# Patient Record
Sex: FEMALE | Race: WHITE | NOT HISPANIC OR LATINO | Employment: FULL TIME | URBAN - METROPOLITAN AREA
[De-identification: names, ages, dates, MRNs, and addresses within clinical notes are randomized per-mention and may not be internally consistent; named-entity substitution may affect disease eponyms.]

---

## 2021-07-02 ENCOUNTER — OFFICE VISIT (OUTPATIENT)
Dept: INTERNAL MEDICINE | Facility: CLINIC | Age: 31
End: 2021-07-02

## 2021-07-02 VITALS
RESPIRATION RATE: 16 BRPM | TEMPERATURE: 98.4 F | DIASTOLIC BLOOD PRESSURE: 78 MMHG | BODY MASS INDEX: 45.65 KG/M2 | WEIGHT: 267.4 LBS | OXYGEN SATURATION: 98 % | SYSTOLIC BLOOD PRESSURE: 132 MMHG | HEART RATE: 97 BPM | HEIGHT: 64 IN

## 2021-07-02 DIAGNOSIS — E66.01 CLASS 3 SEVERE OBESITY DUE TO EXCESS CALORIES WITH BODY MASS INDEX (BMI) OF 45.0 TO 49.9 IN ADULT, UNSPECIFIED WHETHER SERIOUS COMORBIDITY PRESENT (HCC): ICD-10-CM

## 2021-07-02 DIAGNOSIS — Z12.4 ENCOUNTER FOR PAPANICOLAOU SMEAR OF CERVIX: ICD-10-CM

## 2021-07-02 DIAGNOSIS — D23.72: ICD-10-CM

## 2021-07-02 DIAGNOSIS — R73.01 IMPAIRED FASTING GLUCOSE: ICD-10-CM

## 2021-07-02 DIAGNOSIS — Z13.0 SCREENING FOR DEFICIENCY ANEMIA: ICD-10-CM

## 2021-07-02 DIAGNOSIS — Z11.59 ENCOUNTER FOR HEPATITIS C SCREENING TEST FOR LOW RISK PATIENT: ICD-10-CM

## 2021-07-02 DIAGNOSIS — Z13.29 SCREENING FOR THYROID DISORDER: ICD-10-CM

## 2021-07-02 DIAGNOSIS — Z76.89 ENCOUNTER TO ESTABLISH CARE WITH NEW DOCTOR: Primary | ICD-10-CM

## 2021-07-02 DIAGNOSIS — Z13.220 SCREENING FOR LIPID DISORDERS: ICD-10-CM

## 2021-07-02 PROCEDURE — 99204 OFFICE O/P NEW MOD 45 MIN: CPT | Performed by: FAMILY MEDICINE

## 2021-07-02 NOTE — PROGRESS NOTES
"Chief Complaint  Establish Care and Foot Problem (lump )    Subjective          Dusty Mireles presents to [facility] PRIMARY CARE  History of Present Illness     No prior PCP in many years    Impaired fasting glucose -she says a few years back that she was told that she had some sugar in her urine as well as an elevated blood sugar.  I reviewed the chart and saw that she had 50 glucose in her urine as well as a blood sugar in the 170s.  However she has never had follow-up testing regarding this.  We will need to get labs to find out the stability of this condition and would be a new problem with uncertain prognosis.  Could possibly be diabetes but was never followed up upon.    Patient complaining of a small lesion on her left foot that has been changing color in size and feels hard to the touch.  Denies pain but says that when she shaves she can easily cut it.    Also concerned about her breasts as her sister has been diagnosed with breast cancer and is out of date for her Pap smear.  Currently does not have a gynecologist.    She has not had routine labs in many years.    Objective   Vital Signs:   /78 (BP Location: Right arm, Patient Position: Sitting, Cuff Size: Adult)   Pulse 97   Temp 98.4 °F (36.9 °C) (Temporal)   Resp 16   Ht 162.6 cm (64\")   Wt 121 kg (267 lb 6.4 oz)   SpO2 98%   BMI 45.90 kg/m²     Physical Exam  Vitals and nursing note reviewed.   Constitutional:       General: She is not in acute distress.     Appearance: Normal appearance. She is obese.   Cardiovascular:      Rate and Rhythm: Normal rate and regular rhythm.      Heart sounds: Normal heart sounds. No murmur heard.     Pulmonary:      Effort: Pulmonary effort is normal.      Breath sounds: Normal breath sounds.   Skin:            Comments: Approximately 1 cm x 1 cm well-circumscribed dermatofibroma with homogenous pink color.   Neurological:      Mental Status: She is alert.        Result Review :   The " following data was reviewed by: Leland Szymanski MD on 07/02/2021:                Assessment and Plan    Diagnoses and all orders for this visit:    1. Encounter to establish care with new doctor (Primary)  -     CBC (No Diff)  -     Comprehensive Metabolic Panel  -     Hemoglobin A1c  -     Lipid Panel  -     TSH Rfx On Abnormal To Free T4  -     Hepatitis C antibody    2. Dermatofibroma of foot, left  -     Ambulatory Referral to Dermatology    3. Encounter for Papanicolaou smear of cervix  -     Ambulatory Referral to Gynecology    4. Impaired fasting glucose  -     CBC (No Diff)  -     Comprehensive Metabolic Panel  -     Hemoglobin A1c    5. Screening for deficiency anemia  -     CBC (No Diff)    6. Screening for thyroid disorder  -     TSH Rfx On Abnormal To Free T4    7. Screening for lipid disorders  -     Lipid Panel    8. Encounter for hepatitis C screening test for low risk patient  -     Hepatitis C antibody    9. Class 3 severe obesity due to excess calories with body mass index (BMI) of 45.0 to 49.9 in adult, unspecified whether serious comorbidity present (CMS/[facility])      Routine labs today and I am going to check her A1c as well to see what the status of her impaired fasting glucose is at this point.  Refer to dermatology for the dermatofibroma of her left foot.  Refer to gynecology for her routine female care.  Plan on seeing back in about 1 year for an annual physical as long as her labs are okay.  If she is still showing signs of prediabetes then I will likely have her follow-up in 6 months.  Provided information regarding diet on the AVS for the obesity.        Follow Up   Return in about 1 year (around 6/20/2022) for Annual physical.  Patient was given instructions and counseling regarding her condition or for health maintenance advice. Please see specific information pulled into the AVS if appropriate.

## 2021-07-02 NOTE — PATIENT INSTRUCTIONS

## 2021-07-03 LAB
ALBUMIN SERPL-MCNC: 4.7 G/DL (ref 3.5–5.2)
ALBUMIN/GLOB SERPL: 2 G/DL
ALP SERPL-CCNC: 86 U/L (ref 39–117)
ALT SERPL-CCNC: 44 U/L (ref 1–33)
AST SERPL-CCNC: 27 U/L (ref 1–32)
BILIRUB SERPL-MCNC: 0.6 MG/DL (ref 0–1.2)
BUN SERPL-MCNC: 8 MG/DL (ref 6–20)
BUN/CREAT SERPL: 10.5 (ref 7–25)
CALCIUM SERPL-MCNC: 9.8 MG/DL (ref 8.6–10.5)
CHLORIDE SERPL-SCNC: 103 MMOL/L (ref 98–107)
CHOLEST SERPL-MCNC: 179 MG/DL (ref 0–200)
CO2 SERPL-SCNC: 24.4 MMOL/L (ref 22–29)
CREAT SERPL-MCNC: 0.76 MG/DL (ref 0.57–1)
ERYTHROCYTE [DISTWIDTH] IN BLOOD BY AUTOMATED COUNT: 12.9 % (ref 12.3–15.4)
GLOBULIN SER CALC-MCNC: 2.3 GM/DL
GLUCOSE SERPL-MCNC: 96 MG/DL (ref 65–99)
HBA1C MFR BLD: 6.2 % (ref 4.8–5.6)
HCT VFR BLD AUTO: 48.5 % (ref 34–46.6)
HCV AB S/CO SERPL IA: <0.1 S/CO RATIO (ref 0–0.9)
HDLC SERPL-MCNC: 35 MG/DL (ref 40–60)
HGB BLD-MCNC: 16.3 G/DL (ref 12–15.9)
LDLC SERPL CALC-MCNC: 96 MG/DL (ref 0–100)
MCH RBC QN AUTO: 31.3 PG (ref 26.6–33)
MCHC RBC AUTO-ENTMCNC: 33.6 G/DL (ref 31.5–35.7)
MCV RBC AUTO: 93.1 FL (ref 79–97)
PLATELET # BLD AUTO: 349 10*3/MM3 (ref 140–450)
POTASSIUM SERPL-SCNC: 4.6 MMOL/L (ref 3.5–5.2)
PROT SERPL-MCNC: 7 G/DL (ref 6–8.5)
RBC # BLD AUTO: 5.21 10*6/MM3 (ref 3.77–5.28)
SODIUM SERPL-SCNC: 137 MMOL/L (ref 136–145)
TRIGL SERPL-MCNC: 286 MG/DL (ref 0–150)
TSH SERPL DL<=0.005 MIU/L-ACNC: 2.25 UIU/ML (ref 0.27–4.2)
VLDLC SERPL CALC-MCNC: 48 MG/DL (ref 5–40)
WBC # BLD AUTO: 10.92 10*3/MM3 (ref 3.4–10.8)

## 2021-09-29 ENCOUNTER — OFFICE VISIT (OUTPATIENT)
Dept: OBSTETRICS AND GYNECOLOGY | Age: 31
End: 2021-09-29

## 2021-09-29 VITALS
SYSTOLIC BLOOD PRESSURE: 134 MMHG | HEIGHT: 64 IN | WEIGHT: 257.4 LBS | DIASTOLIC BLOOD PRESSURE: 82 MMHG | BODY MASS INDEX: 43.94 KG/M2

## 2021-09-29 DIAGNOSIS — Z80.3 FAMILY HISTORY OF BREAST CANCER: ICD-10-CM

## 2021-09-29 DIAGNOSIS — N93.9 ABNORMAL UTERINE BLEEDING (AUB): ICD-10-CM

## 2021-09-29 DIAGNOSIS — Z01.419 WELL WOMAN EXAM WITH ROUTINE GYNECOLOGICAL EXAM: Primary | ICD-10-CM

## 2021-09-29 DIAGNOSIS — Z76.89 ENCOUNTER TO ESTABLISH CARE: ICD-10-CM

## 2021-09-29 DIAGNOSIS — Z12.4 SCREENING FOR CERVICAL CANCER: ICD-10-CM

## 2021-09-29 PROCEDURE — 99385 PREV VISIT NEW AGE 18-39: CPT | Performed by: STUDENT IN AN ORGANIZED HEALTH CARE EDUCATION/TRAINING PROGRAM

## 2021-09-29 PROCEDURE — 99213 OFFICE O/P EST LOW 20 MIN: CPT | Performed by: STUDENT IN AN ORGANIZED HEALTH CARE EDUCATION/TRAINING PROGRAM

## 2021-09-29 RX ORDER — MEDROXYPROGESTERONE ACETATE 10 MG/1
10 TABLET ORAL DAILY
Qty: 10 TABLET | Refills: 0 | Status: SHIPPED | OUTPATIENT
Start: 2021-09-29 | End: 2021-10-09

## 2021-09-29 NOTE — PROGRESS NOTES
[facility]   Obstetrics and Gynecology   Routine Annual Visit    2021    Patient: Dusty Mireles          MR#:[id number]    History of Present Illness    Chief Complaint   Patient presents with   • Gynecologic Exam     NGYN - AE today, c/o bleeding since    • Establish Care       31 y.o. female  who presents for annual exam.  She has never been to a gynecologist and has lots of anxiety surrounding doctors.  She does not think she has ever had a Pap smear.    She recently had sister diagnosed with breast cancer at 35 years old.  She does not keep in touch with her so does not know many details.  Does not know if sister had genetic testing.  Patient's mother  when she was younger from brain cancer.    Patient's other concern today is abnormal uterine bleeding.  She reports that she normally has regular monthly menses that last around 5 days.  However now, she has been bleeding since  and it is significantly heavier than usual.  She is also reporting significant cramping pain and back pain, which is abnormal for her.      Obstetric History:  OB History        1    Para        Term                AB   1    Living           SAB        TAB        Ectopic        Molar        Multiple        Live Births                   Menstrual History:     Patient's last menstrual period was 2021 (exact date).     See above    Sexual History:     Sexually active with  only who is 50 years old, has 2 children from a different partner, 1 child is mentally handicapped,  has vasectomy    Declines STD testing    Reports that  has long history of herpes and patient thinks she had outbreak 4 to 5 years ago but never since  ________________________________________  Patient Active Problem List   Diagnosis   • Impaired fasting glucose     Past Medical History:   Diagnosis Date   • Epiglottitis      Past Surgical History:   Procedure Laterality Date   • WISDOM  "TOOTH EXTRACTION       Social History     Tobacco Use   Smoking Status Current Some Day Smoker   • Types: Cigarettes   Smokeless Tobacco Never Used     Family History   Problem Relation Age of Onset   • Brain cancer Mother    • No Known Problems Father    • Breast cancer Sister    • Diabetes Maternal Grandmother      Prior to Admission medications    Not on File     ________________________________________    Current contraception: vasectomy  History of abnormal Pap smear: no  Family history of uterine or ovarian cancer: no  Family History of colon cancer/colon polyps: no  History of abnormal mammogram: no  History of abnormal lipids: no    The following portions of the patient's history were reviewed and updated as appropriate: allergies, current medications, past family history, past medical history, past social history, past surgical history and problem list.    Review of Systems   All other systems reviewed and are negative.           Objective     /82   Ht 162.6 cm (64\")   Wt 117 kg (257 lb 6.4 oz)   LMP 09/06/2021 (Exact Date)   Breastfeeding No   BMI 44.18 kg/m²    BP Readings from Last 3 Encounters:   09/29/21 134/82   07/02/21 132/78      Wt Readings from Last 3 Encounters:   09/29/21 117 kg (257 lb 6.4 oz)   07/02/21 121 kg (267 lb 6.4 oz)        BMI: Estimated body mass index is 44.18 kg/m² as calculated from the following:    Height as of this encounter: 162.6 cm (64\").    Weight as of this encounter: 117 kg (257 lb 6.4 oz).    Physical Exam  Vitals and nursing note reviewed. Exam conducted with a chaperone present.   Constitutional:       General: She is not in acute distress.     Appearance: Normal appearance.   HENT:      Head: Normocephalic and atraumatic.   Eyes:      Extraocular Movements: Extraocular movements intact.   Cardiovascular:      Rate and Rhythm: Normal rate and regular rhythm.      Pulses: Normal pulses.      Heart sounds: No murmur heard.     Pulmonary:      Effort: " Pulmonary effort is normal. No respiratory distress.      Breath sounds: Normal breath sounds.   Chest:      Breasts:         Right: Normal. No mass, nipple discharge, skin change or tenderness.         Left: Normal. No mass, nipple discharge, skin change or tenderness.   Abdominal:      General: There is no distension.      Palpations: Abdomen is soft. There is no mass.      Tenderness: There is no abdominal tenderness.   Genitourinary:     General: Normal vulva.      Labia:         Right: No rash or lesion.         Left: No rash or lesion.       Urethra: No prolapse, urethral swelling or urethral lesion.      Vagina: Normal.      Cervix: Normal.      Uterus: Normal.       Adnexa: Right adnexa normal and left adnexa normal.      Comments: Bladder: no masses or tenderness  Perineum/Anus: no masses, lesions, or skin changes  Musculoskeletal:         General: No swelling. Normal range of motion.      Cervical back: Normal range of motion.   Lymphadenopathy:      Upper Body:      Right upper body: No axillary adenopathy.      Left upper body: No axillary adenopathy.   Skin:     General: Skin is warm and dry.   Neurological:      General: No focal deficit present.      Mental Status: She is alert and oriented to person, place, and time.   Psychiatric:         Mood and Affect: Mood normal.         Behavior: Behavior normal.         As part of wellness and prevention, the following topics were discussed with the patient:  Encouraged self breast exam  Physical activity and regular exercised encouraged.   Injury prevention discussed.  Healthy weight discussed.  Nutrition discussed.  Sexual behavior/safe practices discussed.   Sexual transmitted disease prevention   Contraception discussed.   Mental health discussed.   Vaccinations/immunizations addressed.         Patient is a smoker.      Assessment:  Diagnoses and all orders for this visit:    1. Well woman exam with routine gynecological exam (Primary)  -     IGP, Aptima  HPV, Rfx 16 / 18,45  -Breast and pelvic exam normal  -Pap today  -See mammogram discussion below  -Recommended Covid vaccine and flu shot when available  -Discussed Gardasil vaccine at next visit  -Declined STD screening    2. Encounter to establish care    3. Screening for cervical cancer    4. Abnormal uterine bleeding (AUB)  -     CBC (No Diff)  -     TSH Rfx On Abnormal To Free T4  -Recent onset of irregular, heavy menses  -Discussed that there are multiple causes of AUB, including hormonal changes, structural abnormalities, and malignancy.  Because of her history of obesity and glucose intolerance, I recommended endometrial sampling.  However, patient had severe anxiety surrounding pelvic exam and Pap smear today so I suspect endometrial biopsy in office will not be possible.  -Discussed plan to check CBC and TSH today, obtain pelvic ultrasound, and try Provera to stop acute bleeding.  If bleeding persists at this point, would recommend endometrial sampling, with misoprostol and possibly Xanax before.  Patient amenable.  I will call patient when ultrasound results.  -Discussed plan for Provera 10 mg daily for 10 days to stop acute bleeding.  When Provera stopped, discussed expected withdrawal bleed within the next few days.  If menses do not regulate, will plan for endometrial sampling.  May need monthly 10-day course of Provera or long-term option like IUD.  Patient is very hesitant about surgery.    5. Family history of breast cancer  -sister diagnosed at 34yo, not close with sister so does not know details nor if genetic testing performed  -Information for [employer] provided 9/29/2021, patient will contact to seek insurance approval for genetic testing  -Also offered early screening starting now, 5 years prior to first-degree relatives diagnosis, but patient prefers to try genetic testing first      Plan:  Return in about 2 months (around 11/29/2021) for Gyn f/u.      Kayla Mott MD  9/29/2021 13:43  EDT

## 2021-09-30 LAB
ERYTHROCYTE [DISTWIDTH] IN BLOOD BY AUTOMATED COUNT: 12.3 % (ref 12.3–15.4)
HCT VFR BLD AUTO: 44 % (ref 34–46.6)
HGB BLD-MCNC: 15.1 G/DL (ref 12–15.9)
MCH RBC QN AUTO: 31.1 PG (ref 26.6–33)
MCHC RBC AUTO-ENTMCNC: 34.3 G/DL (ref 31.5–35.7)
MCV RBC AUTO: 90.5 FL (ref 79–97)
PLATELET # BLD AUTO: 285 10*3/MM3 (ref 140–450)
RBC # BLD AUTO: 4.86 10*6/MM3 (ref 3.77–5.28)
TSH SERPL DL<=0.005 MIU/L-ACNC: 2.5 UIU/ML (ref 0.27–4.2)
WBC # BLD AUTO: 9.21 10*3/MM3 (ref 3.4–10.8)

## 2021-10-05 LAB
CYTOLOGIST CVX/VAG CYTO: ABNORMAL
CYTOLOGY CVX/VAG DOC CYTO: ABNORMAL
CYTOLOGY CVX/VAG DOC THIN PREP: ABNORMAL
DX ICD CODE: ABNORMAL
HIV 1 & 2 AB SER-IMP: ABNORMAL
HPV I/H RISK 4 DNA CVX QL PROBE+SIG AMP: POSITIVE
HPV16 DNA CVX QL PROBE+SIG AMP: NEGATIVE
HPV18+45 E6+E7 MRNA CVX QL NAA+PROBE: NEGATIVE
OTHER STN SPEC: ABNORMAL
STAT OF ADQ CVX/VAG CYTO-IMP: ABNORMAL

## 2021-10-06 DIAGNOSIS — N93.9 ABNORMAL UTERINE BLEEDING (AUB): Primary | ICD-10-CM

## 2021-10-06 RX ORDER — PROGESTERONE 200 MG/1
200 CAPSULE ORAL DAILY
Qty: 2 CAPSULE | Refills: 0 | Status: SHIPPED | OUTPATIENT
Start: 2021-10-06 | End: 2021-11-01 | Stop reason: HOSPADM

## 2021-10-12 ENCOUNTER — TELEPHONE (OUTPATIENT)
Dept: OBSTETRICS AND GYNECOLOGY | Age: 31
End: 2021-10-12

## 2021-10-29 ENCOUNTER — PRE-ADMISSION TESTING (OUTPATIENT)
Dept: PREADMISSION TESTING | Facility: HOSPITAL | Age: 31
End: 2021-10-29

## 2021-10-29 VITALS
HEIGHT: 64 IN | DIASTOLIC BLOOD PRESSURE: 91 MMHG | TEMPERATURE: 97.7 F | WEIGHT: 250 LBS | SYSTOLIC BLOOD PRESSURE: 142 MMHG | RESPIRATION RATE: 16 BRPM | HEART RATE: 78 BPM | OXYGEN SATURATION: 98 % | BODY MASS INDEX: 42.68 KG/M2

## 2021-10-29 LAB
DEPRECATED RDW RBC AUTO: 41.5 FL (ref 37–54)
ERYTHROCYTE [DISTWIDTH] IN BLOOD BY AUTOMATED COUNT: 12.4 % (ref 12.3–15.4)
HCG SERPL QL: NEGATIVE
HCT VFR BLD AUTO: 47.1 % (ref 34–46.6)
HGB BLD-MCNC: 15.9 G/DL (ref 12–15.9)
MCH RBC QN AUTO: 30.9 PG (ref 26.6–33)
MCHC RBC AUTO-ENTMCNC: 33.8 G/DL (ref 31.5–35.7)
MCV RBC AUTO: 91.6 FL (ref 79–97)
PLATELET # BLD AUTO: 286 10*3/MM3 (ref 140–450)
PMV BLD AUTO: 10 FL (ref 6–12)
RBC # BLD AUTO: 5.14 10*6/MM3 (ref 3.77–5.28)
SARS-COV-2 ORF1AB RESP QL NAA+PROBE: NOT DETECTED
WBC # BLD AUTO: 11.26 10*3/MM3 (ref 3.4–10.8)

## 2021-10-29 PROCEDURE — 36415 COLL VENOUS BLD VENIPUNCTURE: CPT

## 2021-10-29 PROCEDURE — C9803 HOPD COVID-19 SPEC COLLECT: HCPCS | Performed by: NURSE PRACTITIONER

## 2021-10-29 PROCEDURE — 85027 COMPLETE CBC AUTOMATED: CPT

## 2021-10-29 PROCEDURE — 84703 CHORIONIC GONADOTROPIN ASSAY: CPT

## 2021-10-29 PROCEDURE — U0004 COV-19 TEST NON-CDC HGH THRU: HCPCS | Performed by: NURSE PRACTITIONER

## 2021-10-31 PROCEDURE — S0260 H&P FOR SURGERY: HCPCS | Performed by: STUDENT IN AN ORGANIZED HEALTH CARE EDUCATION/TRAINING PROGRAM

## 2021-11-01 ENCOUNTER — ANESTHESIA EVENT (OUTPATIENT)
Dept: PERIOP | Facility: HOSPITAL | Age: 31
End: 2021-11-01

## 2021-11-01 ENCOUNTER — ANESTHESIA (OUTPATIENT)
Dept: PERIOP | Facility: HOSPITAL | Age: 31
End: 2021-11-01

## 2021-11-01 ENCOUNTER — HOSPITAL ENCOUNTER (OUTPATIENT)
Facility: HOSPITAL | Age: 31
Discharge: HOME | End: 2021-11-01
Attending: STUDENT IN AN ORGANIZED HEALTH CARE EDUCATION/TRAINING PROGRAM | Admitting: STUDENT IN AN ORGANIZED HEALTH CARE EDUCATION/TRAINING PROGRAM

## 2021-11-01 VITALS
TEMPERATURE: 97.9 F | BODY MASS INDEX: 43.07 KG/M2 | HEIGHT: 64 IN | HEART RATE: 64 BPM | WEIGHT: 252.3 LBS | OXYGEN SATURATION: 95 % | DIASTOLIC BLOOD PRESSURE: 96 MMHG | SYSTOLIC BLOOD PRESSURE: 142 MMHG | RESPIRATION RATE: 18 BRPM

## 2021-11-01 DIAGNOSIS — N93.9 ABNORMAL UTERINE BLEEDING (AUB): ICD-10-CM

## 2021-11-01 PROCEDURE — 25010000002 PROPOFOL 10 MG/ML EMULSION: Performed by: ANESTHESIOLOGY

## 2021-11-01 PROCEDURE — 58558 HYSTEROSCOPY BIOPSY: CPT | Performed by: STUDENT IN AN ORGANIZED HEALTH CARE EDUCATION/TRAINING PROGRAM

## 2021-11-01 PROCEDURE — C1782 MORCELLATOR: HCPCS | Performed by: STUDENT IN AN ORGANIZED HEALTH CARE EDUCATION/TRAINING PROGRAM

## 2021-11-01 PROCEDURE — 25010000002 MORPHINE PER 10 MG: Performed by: ANESTHESIOLOGY

## 2021-11-01 PROCEDURE — 0 MEPERIDINE PER 100 MG: Performed by: ANESTHESIOLOGY

## 2021-11-01 PROCEDURE — 88305 TISSUE EXAM BY PATHOLOGIST: CPT | Performed by: STUDENT IN AN ORGANIZED HEALTH CARE EDUCATION/TRAINING PROGRAM

## 2021-11-01 PROCEDURE — 25010000002 MIDAZOLAM PER 1 MG: Performed by: ANESTHESIOLOGY

## 2021-11-01 PROCEDURE — 25010000002 KETOROLAC TROMETHAMINE PER 15 MG: Performed by: ANESTHESIOLOGY

## 2021-11-01 PROCEDURE — 58300 INSERT INTRAUTERINE DEVICE: CPT | Performed by: STUDENT IN AN ORGANIZED HEALTH CARE EDUCATION/TRAINING PROGRAM

## 2021-11-01 PROCEDURE — 25010000002 ONDANSETRON PER 1 MG: Performed by: ANESTHESIOLOGY

## 2021-11-01 PROCEDURE — 25010000002 DEXAMETHASONE PER 1 MG: Performed by: ANESTHESIOLOGY

## 2021-11-01 PROCEDURE — 25010000002 FENTANYL CITRATE (PF) 50 MCG/ML SOLUTION: Performed by: ANESTHESIOLOGY

## 2021-11-01 PROCEDURE — C1889 IMPLANT/INSERT DEVICE, NOC: HCPCS | Performed by: STUDENT IN AN ORGANIZED HEALTH CARE EDUCATION/TRAINING PROGRAM

## 2021-11-01 DEVICE — IUD LEVONORGESTREL MIRENA 52MG: Type: IMPLANTABLE DEVICE | Site: UTERUS | Status: FUNCTIONAL

## 2021-11-01 RX ORDER — MEPERIDINE HYDROCHLORIDE 25 MG/ML
12.5 INJECTION INTRAMUSCULAR; INTRAVENOUS; SUBCUTANEOUS ONCE
Status: COMPLETED | OUTPATIENT
Start: 2021-11-01 | End: 2021-11-01

## 2021-11-01 RX ORDER — HYDROCODONE BITARTRATE AND ACETAMINOPHEN 7.5; 325 MG/1; MG/1
1 TABLET ORAL ONCE AS NEEDED
Status: DISCONTINUED | OUTPATIENT
Start: 2021-11-01 | End: 2021-11-01 | Stop reason: HOSPADM

## 2021-11-01 RX ORDER — FENTANYL CITRATE 50 UG/ML
50 INJECTION, SOLUTION INTRAMUSCULAR; INTRAVENOUS
Status: DISCONTINUED | OUTPATIENT
Start: 2021-11-01 | End: 2021-11-01 | Stop reason: HOSPADM

## 2021-11-01 RX ORDER — LIDOCAINE HYDROCHLORIDE 10 MG/ML
0.5 INJECTION, SOLUTION EPIDURAL; INFILTRATION; INTRACAUDAL; PERINEURAL ONCE AS NEEDED
Status: DISCONTINUED | OUTPATIENT
Start: 2021-11-01 | End: 2021-11-01 | Stop reason: HOSPADM

## 2021-11-01 RX ORDER — SODIUM CHLORIDE 0.9 % (FLUSH) 0.9 %
3-10 SYRINGE (ML) INJECTION AS NEEDED
Status: DISCONTINUED | OUTPATIENT
Start: 2021-11-01 | End: 2021-11-01 | Stop reason: HOSPADM

## 2021-11-01 RX ORDER — SODIUM CHLORIDE 0.9 % (FLUSH) 0.9 %
3 SYRINGE (ML) INJECTION EVERY 12 HOURS SCHEDULED
Status: DISCONTINUED | OUTPATIENT
Start: 2021-11-01 | End: 2021-11-01 | Stop reason: HOSPADM

## 2021-11-01 RX ORDER — EPHEDRINE SULFATE 50 MG/ML
5 INJECTION, SOLUTION INTRAVENOUS ONCE AS NEEDED
Status: DISCONTINUED | OUTPATIENT
Start: 2021-11-01 | End: 2021-11-01 | Stop reason: HOSPADM

## 2021-11-01 RX ORDER — DEXAMETHASONE SODIUM PHOSPHATE 10 MG/ML
INJECTION INTRAMUSCULAR; INTRAVENOUS AS NEEDED
Status: DISCONTINUED | OUTPATIENT
Start: 2021-11-01 | End: 2021-11-01 | Stop reason: SURG

## 2021-11-01 RX ORDER — FENTANYL CITRATE 50 UG/ML
25 INJECTION, SOLUTION INTRAMUSCULAR; INTRAVENOUS
Status: DISCONTINUED | OUTPATIENT
Start: 2021-11-01 | End: 2021-11-01 | Stop reason: HOSPADM

## 2021-11-01 RX ORDER — NALOXONE HCL 0.4 MG/ML
0.4 VIAL (ML) INJECTION AS NEEDED
Status: DISCONTINUED | OUTPATIENT
Start: 2021-11-01 | End: 2021-11-01 | Stop reason: HOSPADM

## 2021-11-01 RX ORDER — IBUPROFEN 600 MG/1
600 TABLET ORAL EVERY 6 HOURS PRN
Qty: 30 TABLET | Refills: 0 | Status: SHIPPED | OUTPATIENT
Start: 2021-11-01

## 2021-11-01 RX ORDER — SODIUM CHLORIDE, SODIUM LACTATE, POTASSIUM CHLORIDE, CALCIUM CHLORIDE 600; 310; 30; 20 MG/100ML; MG/100ML; MG/100ML; MG/100ML
100 INJECTION, SOLUTION INTRAVENOUS CONTINUOUS
Status: DISCONTINUED | OUTPATIENT
Start: 2021-11-01 | End: 2021-11-01 | Stop reason: HOSPADM

## 2021-11-01 RX ORDER — MAGNESIUM HYDROXIDE 1200 MG/15ML
LIQUID ORAL AS NEEDED
Status: DISCONTINUED | OUTPATIENT
Start: 2021-11-01 | End: 2021-11-01 | Stop reason: HOSPADM

## 2021-11-01 RX ORDER — SODIUM CHLORIDE, SODIUM LACTATE, POTASSIUM CHLORIDE, CALCIUM CHLORIDE 600; 310; 30; 20 MG/100ML; MG/100ML; MG/100ML; MG/100ML
9 INJECTION, SOLUTION INTRAVENOUS CONTINUOUS
Status: DISCONTINUED | OUTPATIENT
Start: 2021-11-01 | End: 2021-11-01 | Stop reason: HOSPADM

## 2021-11-01 RX ORDER — MIDAZOLAM HYDROCHLORIDE 1 MG/ML
1 INJECTION INTRAMUSCULAR; INTRAVENOUS
Status: DISCONTINUED | OUTPATIENT
Start: 2021-11-01 | End: 2021-11-01 | Stop reason: HOSPADM

## 2021-11-01 RX ORDER — ONDANSETRON 2 MG/ML
INJECTION INTRAMUSCULAR; INTRAVENOUS AS NEEDED
Status: DISCONTINUED | OUTPATIENT
Start: 2021-11-01 | End: 2021-11-01 | Stop reason: SURG

## 2021-11-01 RX ORDER — ONDANSETRON 2 MG/ML
4 INJECTION INTRAMUSCULAR; INTRAVENOUS ONCE AS NEEDED
Status: COMPLETED | OUTPATIENT
Start: 2021-11-01 | End: 2021-11-01

## 2021-11-01 RX ORDER — FAMOTIDINE 10 MG/ML
20 INJECTION, SOLUTION INTRAVENOUS ONCE
Status: COMPLETED | OUTPATIENT
Start: 2021-11-01 | End: 2021-11-01

## 2021-11-01 RX ORDER — KETOROLAC TROMETHAMINE 30 MG/ML
INJECTION, SOLUTION INTRAMUSCULAR; INTRAVENOUS AS NEEDED
Status: DISCONTINUED | OUTPATIENT
Start: 2021-11-01 | End: 2021-11-01 | Stop reason: SURG

## 2021-11-01 RX ORDER — PROPOFOL 10 MG/ML
VIAL (ML) INTRAVENOUS AS NEEDED
Status: DISCONTINUED | OUTPATIENT
Start: 2021-11-01 | End: 2021-11-01 | Stop reason: SURG

## 2021-11-01 RX ORDER — IBUPROFEN 600 MG/1
600 TABLET ORAL EVERY 6 HOURS PRN
Status: DISCONTINUED | OUTPATIENT
Start: 2021-11-01 | End: 2021-11-01 | Stop reason: HOSPADM

## 2021-11-01 RX ORDER — MORPHINE SULFATE 2 MG/ML
2 INJECTION, SOLUTION INTRAMUSCULAR; INTRAVENOUS
Status: DISCONTINUED | OUTPATIENT
Start: 2021-11-01 | End: 2021-11-01 | Stop reason: HOSPADM

## 2021-11-01 RX ADMIN — FAMOTIDINE 20 MG: 10 INJECTION INTRAVENOUS at 14:51

## 2021-11-01 RX ADMIN — ONDANSETRON 4 MG: 2 INJECTION INTRAMUSCULAR; INTRAVENOUS at 15:45

## 2021-11-01 RX ADMIN — MIDAZOLAM 1 MG: 1 INJECTION INTRAMUSCULAR; INTRAVENOUS at 14:51

## 2021-11-01 RX ADMIN — MORPHINE SULFATE 2 MG: 2 INJECTION, SOLUTION INTRAMUSCULAR; INTRAVENOUS at 17:14

## 2021-11-01 RX ADMIN — MEPERIDINE HYDROCHLORIDE 12.5 MG: 25 INJECTION INTRAMUSCULAR; INTRAVENOUS; SUBCUTANEOUS at 17:01

## 2021-11-01 RX ADMIN — PROPOFOL 100 MG: 10 INJECTION, EMULSION INTRAVENOUS at 15:56

## 2021-11-01 RX ADMIN — ONDANSETRON 4 MG: 2 INJECTION INTRAMUSCULAR; INTRAVENOUS at 16:49

## 2021-11-01 RX ADMIN — KETOROLAC TROMETHAMINE 30 MG: 30 INJECTION, SOLUTION INTRAMUSCULAR at 15:44

## 2021-11-01 RX ADMIN — HYDROCODONE BITARTRATE AND ACETAMINOPHEN 1 TABLET: 7.5; 325 TABLET ORAL at 17:17

## 2021-11-01 RX ADMIN — FENTANYL CITRATE 25 MCG: 50 INJECTION INTRAMUSCULAR; INTRAVENOUS at 17:07

## 2021-11-01 RX ADMIN — DEXAMETHASONE SODIUM PHOSPHATE 8 MG: 10 INJECTION INTRAMUSCULAR; INTRAVENOUS at 15:45

## 2021-11-01 RX ADMIN — SODIUM CHLORIDE, POTASSIUM CHLORIDE, SODIUM LACTATE AND CALCIUM CHLORIDE 9 ML/HR: 600; 310; 30; 20 INJECTION, SOLUTION INTRAVENOUS at 14:50

## 2021-11-01 RX ADMIN — FENTANYL CITRATE 50 MCG: 0.05 INJECTION, SOLUTION INTRAMUSCULAR; INTRAVENOUS at 16:24

## 2021-11-01 RX ADMIN — FENTANYL CITRATE 50 MCG: 0.05 INJECTION, SOLUTION INTRAMUSCULAR; INTRAVENOUS at 16:05

## 2021-11-01 RX ADMIN — PROPOFOL 200 MG: 10 INJECTION, EMULSION INTRAVENOUS at 15:46

## 2021-11-01 RX ADMIN — FENTANYL CITRATE 25 MCG: 50 INJECTION INTRAMUSCULAR; INTRAVENOUS at 16:49

## 2021-11-01 RX ADMIN — SODIUM CHLORIDE, POTASSIUM CHLORIDE, SODIUM LACTATE AND CALCIUM CHLORIDE: 600; 310; 30; 20 INJECTION, SOLUTION INTRAVENOUS at 15:39

## 2021-11-01 NOTE — INTERVAL H&P NOTE
H&P reviewed. The patient was examined and there are no changes to the H&P.    Patient desires Mirena IUD insertion with procedure.  Discussed risks, benefits, and alternatives, as well as lasting for 7 years.

## 2021-11-01 NOTE — ANESTHESIA POSTPROCEDURE EVALUATION
Patient: Dusty Mireles    Procedure Summary     Date: 11/01/21 Room / Location: [facility] OR  / [facility] MAIN OR    Anesthesia Start: 1538 Anesthesia Stop: 1641    Procedure: DILATATION AND CURETTAGE HYSTEROSCOPY with myosure (N/A Uterus) Diagnosis:       Abnormal uterine bleeding (AUB)      (Abnormal uterine bleeding (AUB) [N93.9])    Surgeons: Kayla Mott MD Provider: Mague Evans MD    Anesthesia Type: general ASA Status: 3          Anesthesia Type: general    Vitals  Vitals Value Taken Time   /101 11/01/21 1716   Temp 36.6 °C (97.9 °F) 11/01/21 1636   Pulse 66 11/01/21 1725   Resp 18 11/01/21 1715   SpO2 100 % 11/01/21 1725   Vitals shown include unvalidated device data.        Post Anesthesia Care and Evaluation    Patient location during evaluation: PHASE II  Patient participation: complete - patient participated  Level of consciousness: awake  Pain score: 2  Pain management: adequate  Airway patency: patent  Anesthetic complications: No anesthetic complications  PONV Status: none  Cardiovascular status: acceptable  Respiratory status: acceptable  Hydration status: acceptable  Post Neuraxial Block status: Motor and sensory function returned to baseline

## 2021-11-01 NOTE — ANESTHESIA PROCEDURE NOTES
Airway  Urgency: elective    Date/Time: 11/1/2021 3:49 PM  Airway not difficult    General Information and Staff    Patient location during procedure: OR  Anesthesiologist: Mague Evans MD    Indications and Patient Condition  Indications for airway management: airway protection    Preoxygenated: yes  Mask difficulty assessment: 1 - vent by mask    Final Airway Details  Final airway type: supraglottic airway      Successful airway: classic  Size 4    Number of attempts at approach: 1  Assessment: lips, teeth, and gum same as pre-op and atraumatic intubation

## 2021-11-01 NOTE — OP NOTE
DILATATION AND CURETTAGE HYSTEROSCOPY WITH MORCELLATOR  Procedure Report    Patient Name:  Dusty Mireles  YOB: 1990    Date of Surgery:  11/1/2021     Indications:  Abnormal uterine bleeding    Pre-op Diagnosis:   Abnormal uterine bleeding (AUB) [N93.9]       Post-Op Diagnosis Codes:     * Abnormal uterine bleeding (AUB) [N93.9]    Procedure/CPT® Codes:      Procedure(s):  DILATATION AND CURETTAGE HYSTEROSCOPY with myosure    Staff:  Surgeon(s):  Kayla Mott MD         Anesthesia: General    Estimated Blood Loss: minimal    Implants:    Implant Name Type Inv. Item Serial No.  Lot No. LRB No. Used Action   IUD LEVONORGESTREL MIRENA 52MG - [id number] Implant IUD LEVONORGESTREL MIRENA 52MG  [employer] DIV [id number] N/A 1 Implanted     Exp Date 12/2023    Specimen:          Specimens     ID Source Type Tests Collected By Collected At Frozen?    A Endometrial Curettings Tissue · TISSUE PATHOLOGY EXAM   Kayla Mott MD 11/1/21 9427               Findings:   -normal female external genitalia, normal nulliparous cervix  -multiple small polyps and fluffy endometrium within endometrial cavity    Complications: none    Description of Procedure:  The patient was taken to the operating room and placed in supine position.  General anesthesia was administered and the patient was prepped and draped in the usual sterile fashion with her legs placed in Raul stirrups.  Straight catheterization was performed.  The surgical time-out is completed for the procedure    Weighted speculum was placed in the vagina, and the cervix was grasped anteriorly with a single-tooth tenaculum.  Hysteroscope was unable to be passed through cervix without dilation.  Sequential dilators were used to dilate the cervix to 16 [place].  The MyoSure scope was inserted, and the cavity was distended with normal saline.  Complete survey of uterine cavity revealed multiple polyps and fluffy endometrial  tissue.  The tubal ostia are identified bilaterally.  Myosure Reach was used to remove all visible pathology.  Tissue was sent to pathology for evaluation.  Hysteroscope was removed.      Uterus was sounded to 8cm.  Mirena IUD was inserted in typical fashion.  Strings were trimmed to 2cm.  Tenaculum was removed from cervix.  Hemostasis was confirmed.  The patient was awakened and taken to the recovery room in stable condition.      Kayla Mott MD     Date: 11/1/2021  Time: 16:57 EDT

## 2021-11-01 NOTE — ANESTHESIA PREPROCEDURE EVALUATION
Anesthesia Evaluation     Patient summary reviewed and Nursing notes reviewed                Airway   Mallampati: II  TM distance: >3 FB  Neck ROM: full  Dental      Pulmonary    (+) a smoker Current,   Cardiovascular - negative cardio ROS    Rhythm: regular  Rate: normal        Neuro/Psych- negative ROS  GI/Hepatic/Renal/Endo    (+) morbid obesity,      Musculoskeletal (-) negative ROS    Abdominal    Substance History - negative use     OB/GYN negative ob/gyn ROS         Other                        Anesthesia Plan    ASA 3     general   (BMI    Have CMAC available    I have reviewed the patient's history with the patient and the chart, including all pertinent laboratory results and imaging. I have explained the risks of anesthesia including but not limited to dental damage, corneal abrasion, nerve injury, MI, stroke, and death. Questions asked and answered. Anesthetic plan discussed with patient and team as indicated. Patient expressed understanding of the above.  )  intravenous induction     Anesthetic plan, all risks, benefits, and alternatives have been provided, discussed and informed consent has been obtained with: patient.

## 2021-11-03 LAB
LAB AP CASE REPORT: NORMAL
PATH REPORT.FINAL DX SPEC: NORMAL
PATH REPORT.GROSS SPEC: NORMAL

## 2021-11-10 ENCOUNTER — TELEPHONE (OUTPATIENT)
Dept: OBSTETRICS AND GYNECOLOGY | Age: 31
End: 2021-11-10

## 2021-11-10 NOTE — TELEPHONE ENCOUNTER
I returned patient's call yesterday.  She reports continued cramping pelvic pain since surgery, >1 wk now.  She is taking ibuprofen 800mg q8.  She states the medicine helps but wares off around 5 hrs after the dose.  Bleeding has slowed but is still present.  She is working still.  I offered an ultrasound to evaluate the IUD position.  She declined 2/2 work, saying she could not get off.  I offered a 730 ultrasound.  Patient will call to schedule if the pain dose not improve soon.  We discussed alternating tylenol 650mg q6 and ibuprofen 600mg q6 as well.

## 2021-11-17 ENCOUNTER — OFFICE VISIT (OUTPATIENT)
Dept: OBSTETRICS AND GYNECOLOGY | Age: 31
End: 2021-11-17

## 2021-11-17 VITALS
DIASTOLIC BLOOD PRESSURE: 82 MMHG | WEIGHT: 251.2 LBS | BODY MASS INDEX: 42.88 KG/M2 | SYSTOLIC BLOOD PRESSURE: 136 MMHG | HEIGHT: 64 IN

## 2021-11-17 DIAGNOSIS — N92.0 MENORRHAGIA WITH REGULAR CYCLE: Primary | ICD-10-CM

## 2021-11-17 DIAGNOSIS — Z98.890 STATUS POST HYSTEROSCOPY: ICD-10-CM

## 2021-11-17 DIAGNOSIS — R10.2 PELVIC PAIN: ICD-10-CM

## 2021-11-17 PROCEDURE — 99213 OFFICE O/P EST LOW 20 MIN: CPT | Performed by: STUDENT IN AN ORGANIZED HEALTH CARE EDUCATION/TRAINING PROGRAM

## 2021-11-17 RX ORDER — CYCLOBENZAPRINE HCL 10 MG
10 TABLET ORAL 2 TIMES DAILY PRN
Qty: 30 TABLET | Refills: 0 | Status: SHIPPED | OUTPATIENT
Start: 2021-11-17 | End: 2022-11-17

## 2021-11-17 NOTE — PROGRESS NOTES
[facility]   Obstetrics and Gynecology     2021      Patient:  Dusty Mireles   MR#:[id number]    Office note    Chief Complaint   Patient presents with   • Post-op     Post op 2 wks D&C hysteroscopy and Mirena insertion 2021       Subjective     History of Present Illness  31 y.o. female  presents for follow-up after hysteroscopy D&C and IUD placement on 2021 for AUB.  Endometrial survey showed multiple small polyps and fluffy endometrium.  Pathology was consistent with benign proliferative endometrium.    Patient reports persistent pelvic cramping since surgery.  She is taking NSAIDs around-the-clock and still has discomfort in lower abdomen, starts at midline and radiates outwards.  Seems worse in certain positions.  Reports minor spotting otherwise.    Also reports pain around clitoris when wiping that started about 2 days ago.    Relevant data reviewed:  Tissue Pathology Exam (2021 16:33)      Patient Active Problem List   Diagnosis   • Impaired fasting glucose   • Family history of breast cancer   • Abnormal uterine bleeding (AUB)       Past Medical History:   Diagnosis Date   • Abnormal uterine bleeding (AUB)    • History of epiglottitis      Past Surgical History:   Procedure Laterality Date   • D & C HYSTEROSCOPY N/A 2021    Procedure: DILATATION AND CURETTAGE HYSTEROSCOPY with myosure;  Surgeon: Kayla Mott MD;  Location: [facility];  Service: Obstetrics/Gynecology;  Laterality: N/A;   • WISDOM TOOTH EXTRACTION       Obstetric History:  OB History        1    Para        Term                AB   1    Living           SAB        IAB        Ectopic        Molar        Multiple        Live Births                   Menstrual History:     No LMP recorded. Patient has had an implant.       # 1 - Date: None, Sex: None, Weight: None, GA: None, Delivery: None, Apgar1: None, Apgar5: None, Living: None, Birth Comments: None    Family  "History   Problem Relation Age of Onset   • Brain cancer Mother    • No Known Problems Father    • Breast cancer Sister    • Diabetes Maternal Grandmother    • Malig Hyperthermia Neg Hx      Social History     Tobacco Use   • Smoking status: Current Some Day Smoker     Types: Cigarettes   • Smokeless tobacco: Never Used   • Tobacco comment: 1 PACK EVERY TWO WEEKS X 10 YEARS    Vaping Use   • Vaping Use: Never used   Substance Use Topics   • Alcohol use: Yes     Comment: 1-2 GLASSES/ NIGHT    • Drug use: Not Currently     Patient has no known allergies.    Current Outpatient Medications:   •  ibuprofen (ADVIL,MOTRIN) 600 MG tablet, Take 1 tablet by mouth Every 6 (Six) Hours As Needed for Mild Pain ., Disp: 30 tablet, Rfl: 0  •  levonorgestrel (Mirena, 52 MG,) 20 MCG/24HR IUD, 1 each by Intrauterine route 1 (One) Time., Disp: , Rfl:   •  cyclobenzaprine (FLEXERIL) 10 MG tablet, Take 1 tablet by mouth 2 (Two) Times a Day As Needed for Muscle Spasms., Disp: 30 tablet, Rfl: 0    The following portions of the patient's history were reviewed and updated as appropriate: allergies, current medications, past family history, past medical history, past social history, past surgical history and problem list.    Review of Systems   Genitourinary: Positive for pelvic pain.   All other systems reviewed and are negative.      BP Readings from Last 3 Encounters:   11/17/21 136/82   11/01/21 142/96   10/29/21 142/91      Wt Readings from Last 3 Encounters:   11/17/21 114 kg (251 lb 3.2 oz)   11/01/21 114 kg (252 lb 4.8 oz)   10/29/21 113 kg (250 lb)      BMI: Estimated body mass index is 43.12 kg/m² as calculated from the following:    Height as of this encounter: 162.6 cm (64\").    Weight as of this encounter: 114 kg (251 lb 3.2 oz). BSA: Estimated body surface area is 2.16 meters squared as calculated from the following:    Height as of this encounter: 162.6 cm (64\").    Weight as of this encounter: 114 kg (251 lb 3.2 " oz).    Objective   Physical Exam  Vitals and nursing note reviewed. Exam conducted with a chaperone present.   Constitutional:       General: She is not in acute distress.     Appearance: Normal appearance.   Pulmonary:      Effort: Pulmonary effort is normal.   Abdominal:      Palpations: Abdomen is soft. There is no mass.      Tenderness: There is no abdominal tenderness. There is no guarding or rebound.   Genitourinary:     General: Normal vulva.      Vagina: Normal.      Cervix: Normal.      Uterus: Normal.       Adnexa: Right adnexa normal and left adnexa normal.   Neurological:      Mental Status: She is alert.         Assessment/Plan     Diagnoses and all orders for this visit:    1. Menorrhagia with regular cycle (Primary)    2. Pelvic pain    3. Status post hysteroscopy    Other orders  -     cyclobenzaprine (FLEXERIL) 10 MG tablet; Take 1 tablet by mouth 2 (Two) Times a Day As Needed for Muscle Spasms.  Dispense: 30 tablet; Refill: 0    -Discussed benign pathology  -Ultrasound today confirms IUD in correct position  -Discussed continuing Tylenol and ibuprofen every 6 hours, alternating.  Also discussed adding Flexeril up to twice daily as needed, as patient's pain sounds muscular in origin.  We discussed that this is a short-term treatment.  -We discussed keeping IUD for up to 3 months to see if symptoms resolve.  We also discussed that patient can request removal of any point if she could no longer tolerate symptoms.  She would like to keep in place for now.  RTC 2 weeks to see if Flexeril helped.    Return in about 2 weeks (around 12/1/2021) for F/u pelvic pain.    Kayla Mott MD   11/17/2021 16:41 EST

## 2021-11-17 NOTE — PATIENT INSTRUCTIONS
Alternate tylenol 650mg and ibuprofen 600mg every 6 hours (that means take one of those every 3 hours).  Add flexeril up to twice daily.

## 2024-02-01 ENCOUNTER — OFFICE VISIT (OUTPATIENT)
Dept: OBSTETRICS AND GYNECOLOGY | Age: 34
End: 2024-02-01
Payer: COMMERCIAL

## 2024-02-01 VITALS
DIASTOLIC BLOOD PRESSURE: 72 MMHG | SYSTOLIC BLOOD PRESSURE: 126 MMHG | HEIGHT: 64 IN | WEIGHT: 284.2 LBS | BODY MASS INDEX: 48.52 KG/M2

## 2024-02-01 DIAGNOSIS — Z01.419 WELL WOMAN EXAM WITH ROUTINE GYNECOLOGICAL EXAM: Primary | ICD-10-CM

## 2024-02-01 DIAGNOSIS — Z30.432 ENCOUNTER FOR IUD REMOVAL: ICD-10-CM

## 2024-02-01 DIAGNOSIS — Z12.4 SCREENING FOR CERVICAL CANCER: ICD-10-CM

## 2024-02-01 RX ORDER — IBUPROFEN 600 MG/1
600 TABLET ORAL EVERY 6 HOURS PRN
Qty: 30 TABLET | Refills: 11 | Status: SHIPPED | OUTPATIENT
Start: 2024-02-01

## 2024-02-01 NOTE — PROGRESS NOTES
[facility]   Obstetrics and Gynecology   Routine Annual Visit    2024    Patient: Dusty Mireles          MR#:[id number]    History of Present Illness    Chief Complaint   Patient presents with    Follow-up     IUD removal, c/o cramping and heavy periods       33 y.o. female  who presents for annual exam.  Mirena IUD in place since 2021 for menstrual control.  After IUD placement she had some cramping but that improved with time.  Menses and cramping were better to until about 6 months ago.  She now reports more erratic bleeding and severe cramping before menses.  She desires IUD removal.   has vasectomy so does not need pregnancy prevention.    Studies reviewed:  IGP, Aptima HPV, Rfx 16 / 18,45 (2021 14:10) NIL, HPV pos, 16/18 neg, repeat today    Obstetric History:  OB History          1    Para        Term                AB   1    Living             SAB        IAB        Ectopic        Molar        Multiple        Live Births                   Menstrual History:     No LMP recorded (lmp unknown). Patient has had an implant.       Sexual History:   Sexually active with  only, declines STD exam   has vasectomy      ________________________________________  Patient Active Problem List   Diagnosis    Impaired fasting glucose    Family history of breast cancer    Abnormal uterine bleeding (AUB)     Past Medical History:   Diagnosis Date    Abnormal uterine bleeding (AUB)     History of epiglottitis      Past Surgical History:   Procedure Laterality Date    D & C HYSTEROSCOPY N/A 2021    Procedure: DILATATION AND CURETTAGE HYSTEROSCOPY with myosure;  Surgeon: Kayla Mott MD;  Location: [facility];  Service: Obstetrics/Gynecology;  Laterality: N/A;    WISDOM TOOTH EXTRACTION       Social History     Tobacco Use   Smoking Status Former    Types: Cigarettes    Passive exposure: Never   Smokeless Tobacco Never   Tobacco Comments  "   1 PACK EVERY TWO WEEKS X 10 YEARS      Family History   Problem Relation Age of Onset    No Known Problems Father     Brain cancer Mother     Breast cancer Sister 35    Diabetes Maternal Grandmother     Roya Hyperthermia Neg Hx     Ovarian cancer Neg Hx     Uterine cancer Neg Hx     Colon cancer Neg Hx      Prior to Admission medications    Medication Sig Start Date End Date Taking? Authorizing Provider   ibuprofen (ADVIL,MOTRIN) 600 MG tablet Take 1 tablet by mouth Every 6 (Six) Hours As Needed for Mild Pain . 11/1/21  Yes Kayla Mott MD   levonorgestrel (Mirena, 52 MG,) 20 MCG/24HR IUD 1 each by Intrauterine route 1 (One) Time. 11/1/21  Yes ProviderHolger MD     ________________________________________    Current contraception: vasectomy  History of abnormal Pap smear: yes - see above  Family history of uterine or ovarian cancer: no  Family History of colon cancer/colon polyps: no  History of abnormal mammogram: no    The following portions of the patient's history were reviewed and updated as appropriate: allergies, current medications, past family history, past medical history, past social history, past surgical history, and problem list.    Review of Systems   All other systems reviewed and are negative.           Objective     /72   Ht 162.6 cm (64\")   Wt 129 kg (284 lb 3.2 oz)   LMP  (LMP Unknown) Comment: Mirena 11/01/2021  Breastfeeding No   BMI 48.78 kg/m²    BP Readings from Last 3 Encounters:   02/01/24 126/72   11/17/21 136/82   11/01/21 142/96      Wt Readings from Last 3 Encounters:   02/01/24 129 kg (284 lb 3.2 oz)   11/17/21 114 kg (251 lb 3.2 oz)   11/01/21 114 kg (252 lb 4.8 oz)        BMI: Estimated body mass index is 48.78 kg/m² as calculated from the following:    Height as of this encounter: 162.6 cm (64\").    Weight as of this encounter: 129 kg (284 lb 3.2 oz).    Physical Exam  Vitals and nursing note reviewed.   Constitutional:       General: She is not in " acute distress.     Appearance: Normal appearance.   HENT:      Head: Normocephalic and atraumatic.   Eyes:      Extraocular Movements: Extraocular movements intact.   Cardiovascular:      Rate and Rhythm: Normal rate and regular rhythm.   Pulmonary:      Effort: Pulmonary effort is normal. No respiratory distress.   Chest:      Comments: declined  Abdominal:      General: There is no distension.      Palpations: Abdomen is soft. There is no mass.      Tenderness: There is no abdominal tenderness.   Genitourinary:     General: Normal vulva.      Labia:         Right: No rash or lesion.         Left: No rash or lesion.       Urethra: No prolapse, urethral swelling or urethral lesion.      Vagina: Normal.      Cervix: Normal.      Uterus: Normal.       Adnexa: Right adnexa normal and left adnexa normal.      Comments: Bladder: no masses or tenderness  Perineum/Anus: no masses, lesions, or skin changes  Musculoskeletal:         General: No swelling. Normal range of motion.      Cervical back: Normal range of motion.   Skin:     General: Skin is warm and dry.   Neurological:      General: No focal deficit present.      Mental Status: She is alert and oriented to person, place, and time.   Psychiatric:         Mood and Affect: Mood normal.         Behavior: Behavior normal.         As part of wellness and prevention, the following topics were discussed with the patient:  Encouraged self breast exam  Physical activity and regular exercised encouraged.   Injury prevention discussed.  Healthy weight discussed.  Nutrition discussed.  Substance abuse/misuse discussed.  Sexual behavior/safe practices discussed.   Sexual transmitted disease prevention   Contraception discussed.   Mental health discussed.       IUD Removal Procedure Note    Type of IUD:  Mirena  Date of insertion:  see above  Reason for removal:  Side effect: see above    Procedure Time Documentation  The risks of the procedure were reviewed with the patient  including bleeding, infection and unlikely damage to the uterus and the benefits of the procedure were explained to the patient and written informed consent was obtained.  Time out was performed.    Procedure Details  IUD strings visible:  yes  Removal:  IUD strings grasped and IUD removed intact with gentle traction.  The patient tolerated the procedure well.    All appropriate instructions regarding removal were reviewed.    Patient tolerated the procedure well.    Plans for contraception:  vasectomy  Follow up PRN  Patient was advised to call for fever, prolonged/severe pain, or prolonged bleeding  She was advised to use NSAIDs for mild to moderate pain    Procedure time: 7 minutes        Assessment:  Diagnoses and all orders for this visit:    1. Well woman exam with routine gynecological exam (Primary)  -     IGP, Apt HPV,rfx 16 / 18,45    2. Encounter for IUD removal    3. Screening for cervical cancer  -     IGP, Apt HPV,rfx 16 / 18,45    Other orders  -     ibuprofen (ADVIL,MOTRIN) 600 MG tablet; Take 1 tablet by mouth Every 6 (Six) Hours As Needed for Mild Pain.  Dispense: 30 tablet; Refill: 11      -Declined breast exam, pelvic exam normal  - Repeat Pap today  - IUD removed without complication.  Patient declined alternative contraception.  Relies on vasectomy for pregnancy prevention.  Plans to monitor menses and will let me know if she has any issues.  - Ibuprofen refilled at patient request  - Declined STD screening    Plan:  Return in about 1 year (around 2/1/2025) for Annual.      Kayla Mott MD  2/1/2024 09:32 EST

## 2024-02-05 LAB
CYTOLOGIST CVX/VAG CYTO: NORMAL
CYTOLOGY CVX/VAG DOC CYTO: NORMAL
CYTOLOGY CVX/VAG DOC THIN PREP: NORMAL
DX ICD CODE: NORMAL
HIV 1 & 2 AB SER-IMP: NORMAL
HPV I/H RISK 4 DNA CVX QL PROBE+SIG AMP: NEGATIVE
OTHER STN SPEC: NORMAL
STAT OF ADQ CVX/VAG CYTO-IMP: NORMAL

## 2024-02-05 NOTE — PROGRESS NOTES
Please call patient: Pap smear is normal and HPV negative. I recommend repeating in 5 years.  Thank you!    Kayla Mott MD  9/29/2021  10:14 EDT

## 2024-03-19 ENCOUNTER — OFFICE VISIT (OUTPATIENT)
Dept: INTERNAL MEDICINE | Facility: CLINIC | Age: 34
End: 2024-03-19
Payer: COMMERCIAL

## 2024-03-19 VITALS
WEIGHT: 285.6 LBS | HEART RATE: 95 BPM | DIASTOLIC BLOOD PRESSURE: 72 MMHG | HEIGHT: 64 IN | OXYGEN SATURATION: 98 % | BODY MASS INDEX: 48.76 KG/M2 | SYSTOLIC BLOOD PRESSURE: 130 MMHG

## 2024-03-19 DIAGNOSIS — H65.01 RIGHT ACUTE SEROUS OTITIS MEDIA, RECURRENCE NOT SPECIFIED: Primary | ICD-10-CM

## 2024-03-19 DIAGNOSIS — J30.1 SEASONAL ALLERGIC RHINITIS DUE TO POLLEN: ICD-10-CM

## 2024-03-19 PROCEDURE — 99213 OFFICE O/P EST LOW 20 MIN: CPT | Performed by: NURSE PRACTITIONER

## 2024-03-19 RX ORDER — PREDNISONE 20 MG/1
20 TABLET ORAL DAILY
Qty: 5 TABLET | Refills: 0 | Status: SHIPPED | OUTPATIENT
Start: 2024-03-19 | End: 2024-03-24

## 2024-03-19 RX ORDER — AMOXICILLIN AND CLAVULANATE POTASSIUM 500; 125 MG/1; MG/1
1 TABLET, FILM COATED ORAL 2 TIMES DAILY
Qty: 14 TABLET | Refills: 0 | Status: SHIPPED | OUTPATIENT
Start: 2024-03-19 | End: 2024-03-26

## 2024-03-19 NOTE — PROGRESS NOTES
Chief Complaint  Jaw Pain (Started Sunday ) and Earache     Subjective:      History of Present Illness {CC  Problem List  Visit  Diagnosis   Encounters  Notes  Medications  Labs  Result Review Imaging  Media :23}     Dusty Mireles is a patient of Leland Szymanski MD and presents to [facility] for     Earache   There is pain in the right ear. This is a recurrent problem. The current episode started in the past 7 days. The problem occurs constantly. The problem has been unchanged. The pain is moderate. Associated symptoms include drainage and a sore throat (improved with salt water rinse). Pertinent negatives include no coughing. She has tried acetaminophen and NSAIDs for the symptoms.        States she went to [facility] yesterday and was told by receptionists to go to ER.     Returned to [place] and states everything was in full bloom and made her allergies flare up.     Answers submitted by the patient for this visit:  Other (Submitted on 3/19/2024)  Please describe your symptoms.: My throat is really bad but not down more so back of mouth and nose is stuffed up ear pain is increasing and becoming unbearable and my neck is stiffening just overall need help right now.  Have you had these symptoms before?: No  How long have you been having these symptoms?: 1-4 days  Please list any medications you are currently taking for this condition.: Tried advil and Ibuprofen  Please describe any probable cause for these symptoms. : I don't really know just want the pain to stop i cant sleep due to it worsening and it's not getting better inpy worse as time progresses.  Primary Reason for Visit (Submitted on 3/19/2024)  What is the primary reason for your visit?: Other      I have reviewed patient's medical history, any new submitted information provided by patient or medical assistant and updated medical record.      Objective:      Physical Exam  HENT:      Right Ear: A  "middle ear effusion is present. Tympanic membrane is injected.      Nose:      Right Turbinates: Swollen and pale.      Left Turbinates: Swollen and pale.      Right Sinus: Maxillary sinus tenderness present.      Left Sinus: Maxillary sinus tenderness present.      Mouth/Throat:      Mouth: Mucous membranes are moist.      Tonsils: No tonsillar exudate or tonsillar abscesses. 1+ on the right. 1+ on the left.      Comments: S of prior tonsilliths - none seen today   Cardiovascular:      Rate and Rhythm: Normal rate.      Pulses: Normal pulses.   Pulmonary:      Effort: Pulmonary effort is normal.      Breath sounds: No wheezing or rhonchi.   Lymphadenopathy:      Cervical: Cervical adenopathy present.   Neurological:      Mental Status: She is oriented to person, place, and time.        Result Review  Data Reviewed:{ Labs  Result Review  Imaging  Med Tab  Media :23}                Vital Signs:   /72 (BP Location: Left arm, Patient Position: Sitting, Cuff Size: Adult)   Pulse 95   Ht 162.6 cm (64\")   Wt 130 kg (285 lb 9.6 oz)   SpO2 98%   BMI 49.02 kg/m²         Requested Prescriptions     Signed Prescriptions Disp Refills    amoxicillin-clavulanate (Augmentin) 500-125 MG per tablet 14 tablet 0     Sig: Take 1 tablet by mouth 2 (Two) Times a Day for 7 days.    predniSONE (DELTASONE) 20 MG tablet 5 tablet 0     Sig: Take 1 tablet by mouth Daily for 5 days. Take with food.       Routine medications provided by this office will also be refilled via pharmacy request.       Current Outpatient Medications:     amoxicillin-clavulanate (Augmentin) 500-125 MG per tablet, Take 1 tablet by mouth 2 (Two) Times a Day for 7 days., Disp: 14 tablet, Rfl: 0    ibuprofen (ADVIL,MOTRIN) 600 MG tablet, Take 1 tablet by mouth Every 6 (Six) Hours As Needed for Mild Pain. (Patient not taking: Reported on 3/19/2024), Disp: 30 tablet, Rfl: 11    predniSONE (DELTASONE) 20 MG tablet, Take 1 tablet by mouth Daily for 5 days. Take " with food., Disp: 5 tablet, Rfl: 0     Assessment and Plan:      Assessment and Plan {CC Problem List  Visit Diagnosis  ROS  Review (Popup)  Health Maintenance  Quality  BestPractice  Medications  SmartSets  SnapShot Encounters  Media :23}     Problem List Items Addressed This Visit    None  Visit Diagnoses       Right acute serous otitis media, recurrence not specified    -  Primary    Relevant Medications    amoxicillin-clavulanate (Augmentin) 500-125 MG per tablet    predniSONE (DELTASONE) 20 MG tablet    Seasonal allergic rhinitis due to pollen        advised otc zyrtec, allergra    Relevant Medications    predniSONE (DELTASONE) 20 MG tablet          Will start her on abx and prednisone.       Follow Up {Instructions Charge Capture  Follow-up Communications :23}     Return if symptoms worsen or fail to improve.    Follow up with PCP as scheduled. Needs to make appt: has not been seen in almost 3 years.     Patient was given instructions and counseling regarding her condition or for health maintenance advice. Please see specific information pulled into the AVS if appropriate.    Dragon disclaimer:   Much of this encounter note is an electronic transcription/translation of spoken language to printed text. The electronic translation of spoken language may permit erroneous, or at times, nonsensical words or phrases to be inadvertently transcribed; Although I have reviewed the note for such errors, some may still exist.     Additional Patient Counseling:       There are no Patient Instructions on file for this visit.

## (undated) DEVICE — SOL IRR NACL 0.9PCT 3000ML

## (undated) DEVICE — NEEDLE, QUINCKE, 20GX3.5": Brand: MEDLINE

## (undated) DEVICE — STRAP STIRUP WO/ RNG

## (undated) DEVICE — GLV SURG BIOGEL LTX PF 6 1/2

## (undated) DEVICE — LOU D & C HYSTEROSCOPY: Brand: MEDLINE INDUSTRIES, INC.

## (undated) DEVICE — SEAL HYSTERSCOPE/OUTFLOW CHANNEL MYOSURE

## (undated) DEVICE — DEV TISS REMOV MYOSURE REACH

## (undated) DEVICE — DRAPE,UNDERBUTTOCKS,PCH,STERILE: Brand: MEDLINE